# Patient Record
Sex: FEMALE | Race: BLACK OR AFRICAN AMERICAN
[De-identification: names, ages, dates, MRNs, and addresses within clinical notes are randomized per-mention and may not be internally consistent; named-entity substitution may affect disease eponyms.]

---

## 2018-08-26 ENCOUNTER — HOSPITAL ENCOUNTER (EMERGENCY)
Dept: HOSPITAL 4 - SED | Age: 28
Discharge: HOME | End: 2018-08-26
Payer: COMMERCIAL

## 2018-08-26 VITALS — SYSTOLIC BLOOD PRESSURE: 125 MMHG

## 2018-08-26 VITALS — HEIGHT: 60 IN | BODY MASS INDEX: 25.52 KG/M2 | WEIGHT: 130 LBS

## 2018-08-26 VITALS — SYSTOLIC BLOOD PRESSURE: 138 MMHG

## 2018-08-26 DIAGNOSIS — R10.9: ICD-10-CM
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
## 2018-08-26 DIAGNOSIS — L50.9: ICD-10-CM

## 2018-08-26 DIAGNOSIS — R03.0: ICD-10-CM

## 2018-08-26 DIAGNOSIS — T78.40XA: Primary | ICD-10-CM

## 2018-08-26 PROCEDURE — 99284 EMERGENCY DEPT VISIT MOD MDM: CPT

## 2020-04-13 ENCOUNTER — HOSPITAL ENCOUNTER (EMERGENCY)
Dept: HOSPITAL 4 - SED | Age: 30
Discharge: HOME | End: 2020-04-13
Payer: COMMERCIAL

## 2020-04-13 VITALS — SYSTOLIC BLOOD PRESSURE: 135 MMHG

## 2020-04-13 VITALS — BODY MASS INDEX: 25.76 KG/M2 | HEIGHT: 62 IN | WEIGHT: 140 LBS

## 2020-04-13 DIAGNOSIS — R63.0: ICD-10-CM

## 2020-04-13 DIAGNOSIS — R05: ICD-10-CM

## 2020-04-13 DIAGNOSIS — Z91.018: ICD-10-CM

## 2020-04-13 DIAGNOSIS — U07.1: ICD-10-CM

## 2020-04-13 DIAGNOSIS — R53.1: Primary | ICD-10-CM

## 2020-04-13 PROCEDURE — 36415 COLL VENOUS BLD VENIPUNCTURE: CPT

## 2020-04-13 PROCEDURE — 99283 EMERGENCY DEPT VISIT LOW MDM: CPT

## 2020-04-13 PROCEDURE — 86710 INFLUENZA VIRUS ANTIBODY: CPT

## 2020-04-13 PROCEDURE — U0002 COVID-19 LAB TEST NON-CDC: HCPCS

## 2020-06-08 ENCOUNTER — HOSPITAL ENCOUNTER (OUTPATIENT)
Dept: HOSPITAL 4 - SLB | Age: 30
Discharge: HOME | End: 2020-06-08
Attending: INTERNAL MEDICINE
Payer: COMMERCIAL

## 2020-06-08 DIAGNOSIS — U07.1: Primary | ICD-10-CM

## 2020-07-09 ENCOUNTER — HOSPITAL ENCOUNTER (EMERGENCY)
Dept: HOSPITAL 4 - SED | Age: 30
Discharge: HOME | End: 2020-07-09
Payer: COMMERCIAL

## 2020-07-09 VITALS — SYSTOLIC BLOOD PRESSURE: 138 MMHG

## 2020-07-09 VITALS — WEIGHT: 140 LBS | BODY MASS INDEX: 27.48 KG/M2 | HEIGHT: 60 IN

## 2020-07-09 DIAGNOSIS — R20.2: Primary | ICD-10-CM
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
## 2020-07-09 NOTE — NUR
Patient given written and verbal discharge instructions and verbalizes 
understanding. ER MD discussed with patient the results and treatment provided. 
Patient in stable condition. ID arm band removed. 

NO Rx given. Patient educated on pain management and to follow up with PMD. 
Pain Scale 0/10.

Opportunity for questions provided and answered.

## 2020-07-09 NOTE — NUR
Patient came to ER. C/O Needlestick and Medical Clearance x today. Patient work 
in ICU , Patient had needlestick left thump at 1800 PM while injected insulin 
for her patient. Hx Positive Covid-19 



Patient came to have blood test as fingerstick incident protocol. 



A/O,X4, denies left thump pain. 



Patient states "didn't get insulin, happening after gave all medication."

## 2020-08-20 ENCOUNTER — HOSPITAL ENCOUNTER (OUTPATIENT)
Dept: HOSPITAL 4 - SLB | Age: 30
Discharge: HOME | End: 2020-08-20
Attending: INTERNAL MEDICINE
Payer: COMMERCIAL
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
## 2020-08-20 DIAGNOSIS — T14.8XXA: Primary | ICD-10-CM
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
## 2020-08-21 LAB — HCV AB S/CO SERPL IA: <0.1 S/CO RATIO (ref 0–0.9)

## 2020-11-16 ENCOUNTER — HOSPITAL ENCOUNTER (OUTPATIENT)
Dept: HOSPITAL 4 - SLB | Age: 30
Discharge: HOME | End: 2020-11-16
Attending: INTERNAL MEDICINE
Payer: COMMERCIAL
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
## 2020-11-16 DIAGNOSIS — T14.90XA: Primary | ICD-10-CM

## 2020-11-17 LAB — HCV AB S/CO SERPL IA: <0.1 S/CO RATIO (ref 0–0.9)

## 2021-06-16 ENCOUNTER — HOSPITAL ENCOUNTER (OUTPATIENT)
Dept: HOSPITAL 4 - SLB | Age: 31
Discharge: HOME | End: 2021-06-16
Attending: INTERNAL MEDICINE
Payer: COMMERCIAL
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
## 2021-06-16 DIAGNOSIS — T14.90XA: Primary | ICD-10-CM

## 2021-06-18 LAB — HCV AB S/CO SERPL IA: <0.1 S/CO RATIO (ref 0–0.9)
